# Patient Record
(demographics unavailable — no encounter records)

---

## 2024-10-15 NOTE — HISTORY OF PRESENT ILLNESS
[FreeTextEntry1] : 28-year-old female -0-0-1 presenting for her postpartum appointment status post vaginal livery on 2023 at 40 weeks 0 days gestation delivering a baby girl weighing 3540 g with Apgars 8 and 8.  Estimated blood loss was 275 cc and delivery significant for second-degree midline laceration which was repaired without complication.  Pregnancy complicated by Rh- blood type, postdates pregnancy, prima parous.  She is currently pumping and her menstrual period has not returned.  She does not wish for contraception at this time.

## 2024-10-15 NOTE — PLAN
[FreeTextEntry1] : Patient is doing well in the postpartum period and was counseled on expectations regarding the return of her menstrual period status post vaginal delivery and while pumping/breast-feeding.  She does not wish for contraception at this time, but understands it may be available upon request.  She is given opportunity ask questions all questions addressed return to office in 3 months time for her annual well appointment or as needed.

## 2024-10-15 NOTE — COUNSELING
[Nutrition/ Exercise/ Weight Management] : nutrition, exercise, weight management [Vitamins/Supplements] : vitamins/supplements [Contraception/ Emergency Contraception/ Safe Sexual Practices] : contraception, emergency contraception, safe sexual practices [Confidentiality] : confidentiality

## 2025-01-08 NOTE — HISTORY OF PRESENT ILLNESS
[FreeTextEntry1] : 29-year-old female -0-0-1 presenting office for her annual well woman appointment.  She has no complaints today.  Obstetric history of 1 prior vaginal delivery 2023.  Pregnancy significant for Rh- blood type, postdates pregnancy.  Denies gynecologic history of uterine fibroids, ovarian cyst, STIs.  Menstruation starting the age 11 occurring regularly lasting proxy 5 days which do not affect her daily life.  Denies medical history.  Surgical history of wisdom tooth extraction.  Family history significant for maternal great aunt with breast cancer in her 70s.  No other first-degree relatives.  Denies alcohol, tobacco, illicit drug use.  Allergy to penicillin which results in a rash.

## 2025-01-08 NOTE — PLAN
[FreeTextEntry1] : Clinical breast exam performed and self breast exam explained along with indications for early screening.  Cervical Pap smear performed and importance of maintaining yearly well appointments and cervical Pap smears reviewed.  She should follow with her PCP as directed.  She does not wish for contraception at this time, but understands it may be made available upon her request.  She is to return to office in 1 years time, or as needed.  She was given the opportunity to ask questions and all questions were addressed.